# Patient Record
Sex: FEMALE | Race: WHITE | HISPANIC OR LATINO | Employment: STUDENT | URBAN - METROPOLITAN AREA
[De-identification: names, ages, dates, MRNs, and addresses within clinical notes are randomized per-mention and may not be internally consistent; named-entity substitution may affect disease eponyms.]

---
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
## 2023-10-25 ENCOUNTER — ULTRASOUND (OUTPATIENT)
Dept: OBGYN CLINIC | Facility: CLINIC | Age: 23
End: 2023-10-25

## 2023-10-25 VITALS — DIASTOLIC BLOOD PRESSURE: 70 MMHG | SYSTOLIC BLOOD PRESSURE: 127 MMHG | WEIGHT: 144.8 LBS | HEART RATE: 79 BPM

## 2023-10-25 DIAGNOSIS — Z11.3 SCREENING EXAMINATION FOR STD (SEXUALLY TRANSMITTED DISEASE): ICD-10-CM

## 2023-10-25 DIAGNOSIS — O36.80X0 ENCOUNTER TO DETERMINE FETAL VIABILITY OF PREGNANCY, SINGLE OR UNSPECIFIED FETUS: Primary | ICD-10-CM

## 2023-10-25 DIAGNOSIS — N89.8 VAGINAL DISCHARGE: ICD-10-CM

## 2023-10-25 PROCEDURE — 87491 CHLMYD TRACH DNA AMP PROBE: CPT

## 2023-10-25 PROCEDURE — 87591 N.GONORRHOEAE DNA AMP PROB: CPT

## 2023-10-25 NOTE — PROGRESS NOTES
[address] [facility]  10/25/2023    S: 26 y.o. [id number] who presents following positive pregnancy test at home 1 month ago. Her urine pregnancy test was negative in the office today, she feels comfortable with the results. She has had a mirena IUD since 2019 and took the pregnancy test because she began having cravings and felt more tired than usual. She had one day of spotting in September but has not had her period since before her IUD. She is experiencing occasional lower abdominal pain which goes away by itself but "feels similar to contractions". She also states she was having discharge with "black spots" and an abnormal smell. She is not concerned about having an STD.    [id number]  O:  Vitals:    10/25/23 1617   BP: 127/70   Pulse: 79   Weight: 65.7 kg (144 lb 12.8 oz)     General: Well appearing, no distress  Respiratory: Unlabored breathing  Cardiovascular: Regular rate. Abdomen: Soft, nontender  Extremities: Warm and well perfused. Non tender. : External genitalia with normal appearance and no lesions. On speculum exam, vagina and cervix with normal appearance and no lesions, small amount of physiologic white/clear discharge noted in vaginal vault, no blood. IUD strings visualized at cervical os. On bimanual exam, no CVA tenderness, uterus normal in size, no adnexal masses appreciated. 1. Encounter to determine fetal viability of pregnancy, single or unspecified fetus  Assessment & Plan:  Urine pregnancy test negative in the office  TVUS ordered to assess correct position of IUD  Beta Hcg ordered to confirm no pregnancy    Orders:  -     hCG, quantitative; Future  -     US abdomen and pelvis with transvaginal; Future; Expected date: 10/25/2023  -     Chlamydia/GC amplified DNA by PCR    2. Vaginal discharge    3.  Screening examination for STD (sexually transmitted disease)  Assessment & Plan:  Patient reporting abnormal discharge, GCCT taken today        Simone Asencio MD  PGY-I, OB/GYN  10/25/2023, 6:40 PM

## 2023-10-25 NOTE — ASSESSMENT & PLAN NOTE
Urine pregnancy test negative in the office  TVUS ordered to assess correct position of IUD  Beta Hcg ordered to confirm no pregnancy

## 2023-10-26 LAB
C TRACH DNA SPEC QL NAA+PROBE: NEGATIVE
N GONORRHOEA DNA SPEC QL NAA+PROBE: NEGATIVE

## 2024-02-15 ENCOUNTER — TELEPHONE (OUTPATIENT)
Dept: OBGYN CLINIC | Facility: CLINIC | Age: 24
End: 2024-02-15

## 2024-02-21 PROBLEM — Z11.3 SCREENING EXAMINATION FOR STD (SEXUALLY TRANSMITTED DISEASE): Status: RESOLVED | Noted: 2020-01-15 | Resolved: 2024-02-21

## 2024-06-19 ENCOUNTER — OFFICE VISIT (OUTPATIENT)
Dept: OBGYN CLINIC | Facility: CLINIC | Age: 24
End: 2024-06-19

## 2024-06-19 VITALS — WEIGHT: 167.4 LBS | HEART RATE: 68 BPM | SYSTOLIC BLOOD PRESSURE: 108 MMHG | DIASTOLIC BLOOD PRESSURE: 71 MMHG

## 2024-06-19 DIAGNOSIS — N94.6 DYSMENORRHEA: ICD-10-CM

## 2024-06-19 DIAGNOSIS — Z59.41 FOOD INSECURITY: Primary | ICD-10-CM

## 2024-06-19 DIAGNOSIS — Z87.42 HX OF OVARIAN CYST: ICD-10-CM

## 2024-06-19 DIAGNOSIS — R10.2 PELVIC PAIN: ICD-10-CM

## 2024-06-19 PROCEDURE — 99214 OFFICE O/P EST MOD 30 MIN: CPT | Performed by: OBSTETRICS & GYNECOLOGY

## 2024-06-19 RX ORDER — IBUPROFEN 800 MG/1
800 TABLET ORAL EVERY 6 HOURS PRN
Qty: 60 TABLET | Refills: 0 | Status: SHIPPED | OUTPATIENT
Start: 2024-06-19

## 2024-06-19 SDOH — ECONOMIC STABILITY - FOOD INSECURITY: FOOD INSECURITY: Z59.41

## 2024-06-19 NOTE — PROGRESS NOTES
Subjective:     Elvira Coffman is a 24 y.o.  female who presents for follow up after ruptured ovarian cyst in January.  Her pain did resolve initially after a few weeks but has returned over the last few weeks and she has it most days. She gets regular periods every month and her last period was 24. Her menstrual cycles usually last 4-5 days. She reports severe dysmenorrhea over the last 1-2 months. She is not taking anything for birth control as she is trying to get pregnant. She is not taking any pain medication for her dysmenorrhea.    Objective:    Vitals: not currently breastfeeding.There is no height or weight on file to calculate BMI.    Physical Exam  Constitutional:       Appearance: She is well-developed.   Cardiovascular:      Rate and Rhythm: Normal rate and regular rhythm.      Heart sounds: Normal heart sounds. No murmur heard.     No friction rub. No gallop.   Pulmonary:      Effort: Pulmonary effort is normal. No respiratory distress.      Breath sounds: No wheezing.   Abdominal:      Palpations: Abdomen is soft.      Tenderness: There is no abdominal tenderness.   Musculoskeletal:         General: No tenderness.   Neurological:      Mental Status: She is alert and oriented to person, place, and time.   Vitals reviewed.       Depression Screening Follow-up Plan: Patient's depression screening was positive with a PHQ-2 score of 0. Their PHQ-9 score was 0. Clinically patient does not have depression. No treatment is required.      Assessment/Plan:    Elvira was seen today for pelvic pain and follow up regarding ruptured ovarian cyst in January.     Diagnoses and all orders for this visit:    Food insecurity  -     Ambulatory referral to social work care management program; Future    Pelvic pain  -     US pelvis complete w transvaginal; Future    Hx of ovarian cyst  -     US pelvis complete w transvaginal; Future    Dysmenorrhea  -     ibuprofen (MOTRIN) 800 mg tablet; Take 1  tablet (800 mg total) by mouth every 6 (six) hours as needed for mild pain Take every 8 hours during menstrual cycle              Chirag Shields MD  6/19/2024  4:17 PM

## 2024-06-25 ENCOUNTER — PATIENT OUTREACH (OUTPATIENT)
Dept: OBGYN CLINIC | Facility: CLINIC | Age: 24
End: 2024-06-25

## 2024-06-25 NOTE — PROGRESS NOTES
NELIA OVERTON was referred by Dr. Shields on 06/19 for food insecurity, NELIA OVERTON called pt today with [id number] - [employer] interpretor, we called x2, we were able to leave a VM on the second call.

## 2024-06-27 ENCOUNTER — HOSPITAL ENCOUNTER (OUTPATIENT)
Dept: ULTRASOUND IMAGING | Facility: HOSPITAL | Age: 24
End: 2024-06-27

## 2024-06-27 DIAGNOSIS — Z87.42 HX OF OVARIAN CYST: ICD-10-CM

## 2024-06-27 DIAGNOSIS — R10.2 PELVIC PAIN: ICD-10-CM

## 2024-06-27 PROCEDURE — 76830 TRANSVAGINAL US NON-OB: CPT

## 2024-06-27 PROCEDURE — 76856 US EXAM PELVIC COMPLETE: CPT

## 2024-06-28 ENCOUNTER — PATIENT OUTREACH (OUTPATIENT)
Dept: OBGYN CLINIC | Facility: CLINIC | Age: 24
End: 2024-06-28

## 2024-06-28 NOTE — PROGRESS NOTES
NELIA OVERTON called pt with the [employer] interpretor to discuss any SDOH needs. Pt did answer on the second attempt. Pt reports that she has been having a hard time with food expenses lately. Pt reports that she is having more additional expenses now and that her SNAp was recently lowered in the amount that she receives. Pt has one child that lives with her. NELIA OVERTON provided resources for the free child lunch program for summers to her at [email or web address]    NELIA OVERTON will remain available as needed.

## 2024-07-02 ENCOUNTER — TELEPHONE (OUTPATIENT)
Dept: OBGYN CLINIC | Facility: CLINIC | Age: 24
End: 2024-07-02

## 2024-07-03 NOTE — TELEPHONE ENCOUNTER
Called PT to advise results are not back but phone number states cannot accept calls at this time.

## 2024-09-06 ENCOUNTER — TELEPHONE (OUTPATIENT)
Dept: OBGYN CLINIC | Facility: CLINIC | Age: 24
End: 2024-09-06

## 2024-09-06 NOTE — TELEPHONE ENCOUNTER
Patient came into the office asked if she can be seen today due to some bleeding while pregnant. Explained to patient provider are gone for the day and she should go to the emergency room. Patient verbalized understanding. Patient is scheduled for 9/23/24 based on last menstrual.

## 2024-09-23 ENCOUNTER — TELEPHONE (OUTPATIENT)
Dept: OBGYN CLINIC | Facility: CLINIC | Age: 24
End: 2024-09-23

## 2025-01-22 ENCOUNTER — OFFICE VISIT (OUTPATIENT)
Dept: FAMILY MEDICINE CLINIC | Facility: CLINIC | Age: 25
End: 2025-01-22

## 2025-01-22 VITALS
DIASTOLIC BLOOD PRESSURE: 78 MMHG | BODY MASS INDEX: 32.23 KG/M2 | TEMPERATURE: 97.5 F | HEIGHT: 63 IN | OXYGEN SATURATION: 99 % | RESPIRATION RATE: 18 BRPM | WEIGHT: 181.9 LBS | SYSTOLIC BLOOD PRESSURE: 114 MMHG | HEART RATE: 65 BPM

## 2025-01-22 DIAGNOSIS — Z00.00 ANNUAL PHYSICAL EXAM: Primary | ICD-10-CM

## 2025-01-22 DIAGNOSIS — Z11.59 NEED FOR HEPATITIS C SCREENING TEST: ICD-10-CM

## 2025-01-22 PROBLEM — N89.8 VAGINAL DISCHARGE: Status: RESOLVED | Noted: 2020-01-15 | Resolved: 2025-01-22

## 2025-01-22 PROCEDURE — 99395 PREV VISIT EST AGE 18-39: CPT | Performed by: FAMILY MEDICINE

## 2025-01-22 NOTE — ASSESSMENT & PLAN NOTE
Patient does not appear to have any medical disorder that would prevent him from driving safely  Advised to report back to [facility] immediately if any new conditions or limitations develop.   Discussed importance of seat belt safety  Advised not to use alcohol, drugs, or substances which inhibit ability to operate a vehicle.   Advised not use cell phone or other devices which can distract while operating a vehicle.   Form filled out, signed, and handed back to the patient.

## 2025-01-22 NOTE — PROGRESS NOTES
Adult Annual Physical  Name: Elvira Coffman      : 2000      MRN: [id number]  Encounter Provider: Romie Dao MD  Encounter Date: 2025   Encounter department: [facility]    Assessment & Plan  Annual physical exam  Patient does not appear to have any medical disorder that would prevent him from driving safely  Advised to report back to [facility] immediately if any new conditions or limitations develop.   Discussed importance of seat belt safety  Advised not to use alcohol, drugs, or substances which inhibit ability to operate a vehicle.   Advised not use cell phone or other devices which can distract while operating a vehicle.   Form filled out, signed, and handed back to the patient.          Need for hepatitis C screening test    Orders:    Hepatitis C Antibody; Future    Immunizations and preventive care screenings were discussed with patient today. Appropriate education was printed on patient's after visit summary.    Counseling:  Alcohol/drug use: discussed moderation in alcohol intake, the recommendations for healthy alcohol use, and avoidance of illicit drug use.  Dental Health: discussed importance of regular tooth brushing, flossing, and dental visits.  Sexual health: discussed sexually transmitted diseases, partner selection, use of condoms, avoidance of unintended pregnancy, and contraceptive alternatives.  Exercise: the importance of regular exercise/physical activity was discussed. Recommend exercise 3-5 times per week for at least 30 minutes.     BMI Counseling: Body mass index is 32.74 kg/m². The BMI is above normal. Nutrition recommendations include decreasing portion sizes, decreasing fast food intake and increasing intake of lean protein. Exercise recommendations include exercising 3-5 times per week. No pharmacotherapy was ordered. Rationale for BMI follow-up plan is due to patient being overweight or obese.      Depression Screening and Follow-up Plan: Patient was screened for depression during today's encounter. They screened negative with a PHQ-2 score of 0.        History of Present Illness     Adult Annual Physical:  Patient presents for annual physical. Elvira is a 24 y.o female presenting today for annual physical. Patient reports to feel well and has no complaints today. .     Diet and Physical Activity:  - Diet/Nutrition: poor diet.  - Exercise: no formal exercise.    Depression Screening:  - PHQ-2 Score: 0    General Health:  - Sleep: 7-8 hours of sleep on average.  - Hearing: normal hearing bilateral ears.  - Vision: no vision problems and most recent eye exam > 1 year ago.  - Dental: regular dental visits and brushes teeth twice daily.    /GYN Health:  - Follows with GYN: yes.   - Menopause: premenopausal.   - Last menstrual cycle: 1/13/2025.   - History of STDs: no  - Contraception:. Planning on getting pregnant. Reports taking OTC prenatal vitamins      Advanced Care Planning:  - Has an advanced directive?: no    - Has a durable medical POA?: no    - ACP document given to patient?: no      Review of Systems   Constitutional:  Negative for fever.   HENT:  Negative for ear pain and sore throat.    Eyes:  Negative for visual disturbance.   Respiratory:  Negative for cough and shortness of breath.    Cardiovascular:  Negative for chest pain and palpitations.   Gastrointestinal:  Negative for abdominal pain and vomiting.   Genitourinary:  Negative for dysuria and hematuria.   Musculoskeletal:  Negative for arthralgias and back pain.   Skin:  Negative for rash.   Neurological:  Negative for dizziness, seizures and syncope.   Psychiatric/Behavioral:  Negative for decreased concentration, hallucinations and sleep disturbance. The patient is not nervous/anxious.    All other systems reviewed and are negative.        Objective   /78 (BP Location: Left arm, Patient Position: Sitting, Cuff Size: Standard)    "Pulse 65   Temp 97.5 °F (36.4 °C) (Temporal)   Resp 18   Ht 5' 2.5\" (1.588 m)   Wt 82.5 kg (181 lb 14.4 oz)   LMP 01/10/2025 (Exact Date)   SpO2 99%   BMI 32.74 kg/m²     Physical Exam  Constitutional:       General: She is not in acute distress.     Appearance: She is not ill-appearing.   HENT:      Head: Normocephalic and atraumatic.      Right Ear: Tympanic membrane and external ear normal. There is no impacted cerumen.      Left Ear: Tympanic membrane and external ear normal. There is no impacted cerumen.      Nose: Nose normal.      Mouth/Throat:      Mouth: Mucous membranes are moist.      Pharynx: Oropharynx is clear. No oropharyngeal exudate or posterior oropharyngeal erythema.   Eyes:      General: No scleral icterus.     Conjunctiva/sclera: Conjunctivae normal.   Cardiovascular:      Rate and Rhythm: Normal rate and regular rhythm.      Pulses: Normal pulses.      Heart sounds: No murmur heard.     No gallop.   Pulmonary:      Effort: Pulmonary effort is normal. No respiratory distress.      Breath sounds: No wheezing or rhonchi.   Abdominal:      General: Bowel sounds are normal. There is no distension.      Tenderness: There is no abdominal tenderness. There is no guarding.   Musculoskeletal:      Cervical back: Normal range of motion.      Right lower leg: No edema.      Left lower leg: No edema.   Skin:     General: Skin is warm and dry.      Capillary Refill: Capillary refill takes less than 2 seconds.   Neurological:      General: No focal deficit present.      Mental Status: She is alert and oriented to person, place, and time.      Motor: No weakness.      Gait: Gait normal.   Psychiatric:         Mood and Affect: Mood normal.         Behavior: Behavior normal.         "

## 2025-05-13 ENCOUNTER — TELEPHONE (OUTPATIENT)
Dept: OBGYN CLINIC | Facility: CLINIC | Age: 25
End: 2025-05-13

## 2025-06-03 NOTE — PROGRESS NOTES
Assessment & Plan  18 y o  Bruce Kamara at 38w6d presenting for routine prenatal visit  Discussed labor precautions, provided hospital number to contact OB Dr  on call  Problem List Items Addressed This Visit        Other    Previous  section complicating pregnancy    Prenatal care in third trimester - Primary    38 weeks gestation of pregnancy        ____________________________________________________________  Subjective  She is without complaint  She denies contractions, loss of fluid, or vaginal bleeding  She feels regular fetal movements      Objective  BP (!) 125/78   Pulse 96   Ht 5' 3" (1 6 m)   Wt 80 7 kg (178 lb)   LMP 2018 (Approximate)   Breastfeeding? No   BMI 31 53 kg/m²     Fetal Heart Rate: 135    Patient's Active Problem List  Patient Active Problem List   Diagnosis    Previous  section complicating pregnancy    Hx of preeclampsia, prior pregnancy, currently pregnant    Threatened  labor    Prenatal care in third trimester    38 weeks gestation of pregnancy     Plan  Call with vaginal bleeding, loss of fluid, regular contractions, decreased fetal movement, other needs or concerns  Return in 1 week  Pt verbalized understanding of all discussed 
No

## 2025-06-04 ENCOUNTER — INITIAL PRENATAL (OUTPATIENT)
Dept: OBGYN CLINIC | Facility: CLINIC | Age: 25
End: 2025-06-04

## 2025-06-04 VITALS
BODY MASS INDEX: 34.02 KG/M2 | HEART RATE: 76 BPM | OXYGEN SATURATION: 99 % | DIASTOLIC BLOOD PRESSURE: 66 MMHG | WEIGHT: 192 LBS | SYSTOLIC BLOOD PRESSURE: 110 MMHG | HEIGHT: 63 IN

## 2025-06-04 DIAGNOSIS — Z3A.11 11 WEEKS GESTATION OF PREGNANCY: Primary | ICD-10-CM

## 2025-06-04 NOTE — LETTER
"    [facility] REFERRAL      Date: 6/4/2025    Patient name: Elvira Coffman    YOB: 2000    Estimated Date of Delivery: 12/19/25      /66 (BP Location: Left arm, Patient Position: Sitting, Cuff Size: Standard)   Pulse 76   Ht 5' 2.5\" (1.588 m)   Wt 87.1 kg (192 lb)   LMP 04/13/2025   SpO2 99%   BMI 34.56 kg/m²         Thank you,  EWA Mejia            [facility] locations:   1. [facility]       [address] [place] [address]       Phone: [phone]       Fax: [phone]     2. [facility]       [address] [address]       Phone: [phone]       Fax: [phone]       "

## 2025-06-04 NOTE — LETTER
6/4/2025      This letter is to confirm that Elvira Coffman is pregnant and is under our care.  Her Estimated Date of Delivery: 12/19/25.    If you have any questions or concerns, please contact our office.  Thank you,    EWA Mejia

## 2025-06-04 NOTE — LETTER
Dentist Letter    Elvira WattstiguaCruz  2000  [address]  [address]  [facility] [place] [facility] [address]          06/04/25    We have had several requests from local dentist requesting permission to perform procedures on our patients who are pregnant. We wish to respond with this letter regarding some of the more routine procedures that we have been asked about.    The following procedures may be performed on our obstetric patients:   1. Administration of local anesthesia   2. Administration of antibiotics such as PCN, Ampicillin, and Erythromycin.   3. Administration of pain medications such as Tylenol, Tylenol with codeine, and if needed Percocet.   4. Shielded X-rays    Should you have any questions, please do not hesitate to contact at [phone].        Sincerely,    [facility]   [phone]

## 2025-06-04 NOTE — PROGRESS NOTES
"OBSTETRIC INTAKE VISIT    Elvira Coffman presents today for initial OB visit and intake at 11w5d. LMP - 25. History obtained from patient and she reports it as follows:    Past Medical History[1]  Past Surgical History[2]  OB History    Para Term  AB Living   3 2 2 0 0 2   SAB IAB Ectopic Multiple Live Births   0 0 0 0 2      # Outcome Date GA Lbr Carter/2nd Weight Sex Type Anes PTL Lv   3 Current            2 Term 19 40w1d  3572 g (7 lb 14 oz) F    KACIE   1 Term 10/13/16 39w0d   M CS-Unspec  N KACIE      Birth Comments: reports  was done secondary to gestation hypertension, denies preeclampsia     Social History[3]    Current Outpatient Medications   Medication Instructions    Prenatal MV & Min w/FA-DHA (PRENATAL GUMMIES PO) Daily       Allergies[4]    Vitals: /66 (BP Location: Left arm, Patient Position: Sitting, Cuff Size: Standard)   Pulse 76   Ht 5' 2.5\" (1.588 m)   Wt 87.1 kg (192 lb)   LMP 2025   SpO2 99%   BMI 34.56 kg/m²     Review of Systems:  Denies vaginal bleeding or leaking fluid.  Denies abdominal/pelvic pain or contractions.    Plan:  1. OB labwork ordered today to include Prenatal Panel, HCV antibody, Hgb fractionation cascade.  Other labs include Glucose 1H PG, protein/creatine ratio, comprehensive metabolic panel.  Advised patient to have drawn within the next few days.  2. Front staff will help pt schedule ultrasound with MFM.  3. Return 25 for H&P prenatal visit.  4. Referrals placed for WIC, , and [facility].  5. Given vaccines: none due.  6. Patient's depression screening was assessed with a PHQ-2 score of 2. Clinically patient does not have depression. No treatment is required.   will call patient.  7. Reviewed the following educational topics with patient:   -routine prenatal visit/ultrasound/labwork schedule   -hospital for delivery and office phone/answering service contact " "information   -nutritional demands of pregnancy, healthy dietary habits   -listeria, toxoplasmosis, seafood precautions   -weight gain expectations (based on pre-pregnant BMI)   -exercise, rest, and sexual activity during pregnancy   -abstinence from alcohol, tobacco, and illegal drugs   -common discomforts of pregnancy and appropriate management   -OTC medications safe to use in pregnancy   -genetic screening options   -vaccines in pregnancy   -symptoms to report to OB provider    -signs of PTL and pre-eclampsia    -vaginal bleeding/leaking of fluid    -severe n/v-unable to tolerate ANY food/fluids for more than 24 hours           [1]   Past Medical History:  Diagnosis Date    Anemia     anemia in pregnancy    Chronic kidney disease     \"kidney pain\" during 1st pregnancy    Hypertension     Hx pre-eclampsia.    Migraine     with pregnancy    Pre-eclampsia    [2]   Past Surgical History:  Procedure Laterality Date     SECTION      pt has 2 year old son, mom had a  due to high blood pressure     AK  DELIVERY ONLY N/A 2019    Procedure:  SECTION () REPEAT;  Surgeon: Sherrill Hanna MD;  Location: [facility];  Service: Obstetrics   [3]   Social History  Tobacco Use    Smoking status: Some Days     Types: E-Cigarettes    Smokeless tobacco: Never   Vaping Use    Vaping status: Former    Substances: Flavoring   Substance Use Topics    Alcohol use: Yes     Alcohol/week: 1.0 standard drink of alcohol     Types: 1 Cans of beer per week     Comment: socially    Drug use: Never   [4] No Known Allergies    "

## 2025-06-04 NOTE — PATIENT INSTRUCTIONS
SENALES FAUSTINO EL EMBARAZO  Llame a nuestra oficina al [phone] para cualquiera de los siguientes:    1. sangrado vaginal  2. Dolor abdominal milad que no desaparece.  3. Fiebre (más de 100.4 y no se zuri con Tylenol)  4. Vómitos persistentes que nichols más de 24 horas.  5. dolor de pecho  6. Dolor o ardor al orinar.  7. Dolor de maddie severo que no se resuelve con Tylenol  8. Visión borrosa o mike puntos en persaud visión.  9. Hinchazón repentina de persaud rosalva o maite.  10. Enrojecimiento, hinchazón o dolor en fortino pierna.  11. Un aumento de peso repentino en pocos días.  12. Contar los movimientos fetales del bebé. (después de 28 semanas o el sexto mes de embarazo)  13. Fortino pérdida de líquido acuoso de la vagina: puede ser un chorro, un goteo o fortino humedad continua  14. Después de 20 semanas de embarazo, calambres rítmicos (más de 4 por hora) o menstruales jovanni dolor bajo / pélvico

## 2025-06-06 ENCOUNTER — PATIENT OUTREACH (OUTPATIENT)
Dept: OBGYN CLINIC | Facility: CLINIC | Age: 25
End: 2025-06-06

## 2025-06-11 ENCOUNTER — APPOINTMENT (OUTPATIENT)
Dept: LAB | Facility: HOSPITAL | Age: 25
End: 2025-06-11
Payer: COMMERCIAL

## 2025-06-11 ENCOUNTER — INITIAL PRENATAL (OUTPATIENT)
Dept: OBGYN CLINIC | Facility: CLINIC | Age: 25
End: 2025-06-11

## 2025-06-11 VITALS — BODY MASS INDEX: 34.95 KG/M2 | WEIGHT: 194.2 LBS | SYSTOLIC BLOOD PRESSURE: 120 MMHG | DIASTOLIC BLOOD PRESSURE: 74 MMHG

## 2025-06-11 DIAGNOSIS — Z3A.11 11 WEEKS GESTATION OF PREGNANCY: ICD-10-CM

## 2025-06-11 DIAGNOSIS — O99.211 OBESITY AFFECTING PREGNANCY IN FIRST TRIMESTER, UNSPECIFIED OBESITY TYPE: ICD-10-CM

## 2025-06-11 DIAGNOSIS — Z98.891 HISTORY OF 2 CESAREAN SECTIONS: ICD-10-CM

## 2025-06-11 DIAGNOSIS — O09.299 HISTORY OF PRE-ECLAMPSIA IN PRIOR PREGNANCY, CURRENTLY PREGNANT: ICD-10-CM

## 2025-06-11 DIAGNOSIS — Z3A.12 12 WEEKS GESTATION OF PREGNANCY: Primary | ICD-10-CM

## 2025-06-11 DIAGNOSIS — Z11.3 SCREEN FOR STD (SEXUALLY TRANSMITTED DISEASE): ICD-10-CM

## 2025-06-11 DIAGNOSIS — Z87.59 HISTORY OF GESTATIONAL HYPERTENSION: ICD-10-CM

## 2025-06-11 DIAGNOSIS — Z34.81 PRENATAL CARE, SUBSEQUENT PREGNANCY IN FIRST TRIMESTER: ICD-10-CM

## 2025-06-11 DIAGNOSIS — Z11.59 NEED FOR HEPATITIS C SCREENING TEST: ICD-10-CM

## 2025-06-11 PROBLEM — Z00.00 ANNUAL PHYSICAL EXAM: Status: RESOLVED | Noted: 2020-01-15 | Resolved: 2025-06-11

## 2025-06-11 LAB
ABO GROUP BLD: NORMAL
ALBUMIN SERPL BCG-MCNC: 4 G/DL (ref 3.5–5)
ALP SERPL-CCNC: 67 U/L (ref 34–104)
ALT SERPL W P-5'-P-CCNC: 22 U/L (ref 7–52)
ANION GAP SERPL CALCULATED.3IONS-SCNC: 10 MMOL/L (ref 4–13)
AST SERPL W P-5'-P-CCNC: 20 U/L (ref 13–39)
BASOPHILS # BLD AUTO: 0.03 THOUSANDS/ÂΜL (ref 0–0.1)
BASOPHILS NFR BLD AUTO: 0 % (ref 0–1)
BILIRUB SERPL-MCNC: 0.17 MG/DL (ref 0.2–1)
BILIRUB UR QL STRIP: NEGATIVE
BLD GP AB SCN SERPL QL: NEGATIVE
BUN SERPL-MCNC: 7 MG/DL (ref 5–25)
CALCIUM SERPL-MCNC: 9.1 MG/DL (ref 8.4–10.2)
CHLORIDE SERPL-SCNC: 104 MMOL/L (ref 96–108)
CLARITY UR: CLEAR
CO2 SERPL-SCNC: 23 MMOL/L (ref 21–32)
COLOR UR: YELLOW
CREAT SERPL-MCNC: 0.41 MG/DL (ref 0.6–1.3)
CREAT UR-MCNC: 99.3 MG/DL
EOSINOPHIL # BLD AUTO: 0.06 THOUSAND/ÂΜL (ref 0–0.61)
EOSINOPHIL NFR BLD AUTO: 1 % (ref 0–6)
ERYTHROCYTE [DISTWIDTH] IN BLOOD BY AUTOMATED COUNT: 13.2 % (ref 11.6–15.1)
GFR SERPL CREATININE-BSD FRML MDRD: 146 ML/MIN/1.73SQ M
GLUCOSE SERPL-MCNC: 79 MG/DL (ref 65–140)
GLUCOSE UR STRIP-MCNC: NEGATIVE MG/DL
HCT VFR BLD AUTO: 34.9 % (ref 34.8–46.1)
HGB BLD-MCNC: 11.5 G/DL (ref 11.5–15.4)
HGB UR QL STRIP.AUTO: NEGATIVE
IMM GRANULOCYTES # BLD AUTO: 0.05 THOUSAND/UL (ref 0–0.2)
IMM GRANULOCYTES NFR BLD AUTO: 1 % (ref 0–2)
KETONES UR STRIP-MCNC: NEGATIVE MG/DL
LEUKOCYTE ESTERASE UR QL STRIP: NEGATIVE
LYMPHOCYTES # BLD AUTO: 2.46 THOUSANDS/ÂΜL (ref 0.6–4.47)
LYMPHOCYTES NFR BLD AUTO: 22 % (ref 14–44)
MCH RBC QN AUTO: 29.7 PG (ref 26.8–34.3)
MCHC RBC AUTO-ENTMCNC: 33 G/DL (ref 31.4–37.4)
MCV RBC AUTO: 90 FL (ref 82–98)
MONOCYTES # BLD AUTO: 0.69 THOUSAND/ÂΜL (ref 0.17–1.22)
MONOCYTES NFR BLD AUTO: 6 % (ref 4–12)
NEUTROPHILS # BLD AUTO: 7.73 THOUSANDS/ÂΜL (ref 1.85–7.62)
NEUTS SEG NFR BLD AUTO: 70 % (ref 43–75)
NITRITE UR QL STRIP: NEGATIVE
NRBC BLD AUTO-RTO: 0 /100 WBCS
PH UR STRIP.AUTO: 6 [PH]
PLATELET # BLD AUTO: 211 THOUSANDS/UL (ref 149–390)
PMV BLD AUTO: 12.3 FL (ref 8.9–12.7)
POTASSIUM SERPL-SCNC: 3.4 MMOL/L (ref 3.5–5.3)
PROT SERPL-MCNC: 7 G/DL (ref 6.4–8.4)
PROT UR STRIP-MCNC: NEGATIVE MG/DL
PROT UR-MCNC: 8.4 MG/DL
PROT/CREAT UR: 0.1 MG/G{CREAT}
RBC # BLD AUTO: 3.87 MILLION/UL (ref 3.81–5.12)
RH BLD: POSITIVE
RUBV IGG SERPL IA-ACNC: 60.7 IU/ML
SODIUM SERPL-SCNC: 137 MMOL/L (ref 135–147)
SP GR UR STRIP.AUTO: 1.02 (ref 1–1.04)
SPECIMEN EXPIRATION DATE: NORMAL
UROBILINOGEN UA: NEGATIVE MG/DL
WBC # BLD AUTO: 11.02 THOUSAND/UL (ref 4.31–10.16)

## 2025-06-11 PROCEDURE — 86850 RBC ANTIBODY SCREEN: CPT

## 2025-06-11 PROCEDURE — 86762 RUBELLA ANTIBODY: CPT

## 2025-06-11 PROCEDURE — 99214 OFFICE O/P EST MOD 30 MIN: CPT | Performed by: OBSTETRICS & GYNECOLOGY

## 2025-06-11 PROCEDURE — 86706 HEP B SURFACE ANTIBODY: CPT

## 2025-06-11 PROCEDURE — 86900 BLOOD TYPING SEROLOGIC ABO: CPT

## 2025-06-11 PROCEDURE — 87340 HEPATITIS B SURFACE AG IA: CPT

## 2025-06-11 PROCEDURE — 85025 COMPLETE CBC W/AUTO DIFF WBC: CPT

## 2025-06-11 PROCEDURE — 86780 TREPONEMA PALLIDUM: CPT

## 2025-06-11 PROCEDURE — 82570 ASSAY OF URINE CREATININE: CPT

## 2025-06-11 PROCEDURE — 80053 COMPREHEN METABOLIC PANEL: CPT

## 2025-06-11 PROCEDURE — 84156 ASSAY OF PROTEIN URINE: CPT

## 2025-06-11 PROCEDURE — 36415 COLL VENOUS BLD VENIPUNCTURE: CPT

## 2025-06-11 PROCEDURE — 87086 URINE CULTURE/COLONY COUNT: CPT

## 2025-06-11 PROCEDURE — 86803 HEPATITIS C AB TEST: CPT

## 2025-06-11 PROCEDURE — 83020 HEMOGLOBIN ELECTROPHORESIS: CPT

## 2025-06-11 PROCEDURE — 87491 CHLMYD TRACH DNA AMP PROBE: CPT | Performed by: OBSTETRICS & GYNECOLOGY

## 2025-06-11 PROCEDURE — 87591 N.GONORRHOEAE DNA AMP PROB: CPT | Performed by: OBSTETRICS & GYNECOLOGY

## 2025-06-11 PROCEDURE — 86901 BLOOD TYPING SEROLOGIC RH(D): CPT

## 2025-06-11 PROCEDURE — 87389 HIV-1 AG W/HIV-1&-2 AB AG IA: CPT

## 2025-06-11 RX ORDER — ASPIRIN 81 MG/1
162 TABLET, CHEWABLE ORAL DAILY
Qty: 60 TABLET | Refills: 11 | Status: SHIPPED | OUTPATIENT
Start: 2025-06-11

## 2025-06-11 NOTE — PROGRESS NOTES
Elvira Coffman presents today for routine OB visit at 12w5d.  HO=476/74  Wt=88.1 kg (194 lb 3.2 oz); Body mass index is 34.95 kg/m².; TWG=5.534 kg (12 lb 3.2 oz)  Fetal Heart Rate: 159  Abdomen: soft, non-tender.  She reports .  Denies vaginal bleeding or leaking of fluid.  Pap/GC/CT done.  Scheduled for ultrasound 25.  Reviewed common discomforts of pregnancy in first trimester and warning signs.  Advised to continue medications and return in 4 weeks.      Current Outpatient Medications   Medication Instructions    Prenatal MV & Min w/FA-DHA (PRENATAL GUMMIES PO) Daily         G3 Problems (from 25 to present)       Problem Noted Diagnosed Resolved    12 weeks gestation of pregnancy 2025 by Yardlie Toussaint-Foster, DO  No    History of 2  sections 2025 by Yardlie Toussaint-Foster, DO  No    Overview Signed 2025  4:26 PM by Yardlie Toussaint-Foster, DO   Desires TOLAC          BMI 34.0-34.9,adult 2025 by Yardlie Toussaint-Foster, DO  No    Overview Signed 2025  4:27 PM by Yardlie Toussaint-Foster, DO   Early glucola         Obesity affecting pregnancy in first trimester 2025 by Yardlie Toussaint-Foster, DO  No    Overview Signed 2025  4:27 PM by Yardlie Toussaint-Foster, DO   Early glucola         History of gestational hypertension 2025 by Yardlie Toussaint-Foster, DO  No    Overview Signed 2025  4:30 PM by Yardlie Toussaint-Foster, DO   Baseline labs pending

## 2025-06-12 ENCOUNTER — APPOINTMENT (OUTPATIENT)
Dept: LAB | Facility: HOSPITAL | Age: 25
End: 2025-06-12
Payer: COMMERCIAL

## 2025-06-12 DIAGNOSIS — Z3A.11 11 WEEKS GESTATION OF PREGNANCY: ICD-10-CM

## 2025-06-12 LAB
BACTERIA UR CULT: NORMAL
C TRACH DNA SPEC QL NAA+PROBE: NEGATIVE
GLUCOSE 1H P 50 G GLC PO SERPL-MCNC: 94 MG/DL (ref 70–134)
HBV SURFACE AB SER-ACNC: 70.3 MIU/ML
HBV SURFACE AG SER QL: NORMAL
HCV AB SER QL: NORMAL
HIV 1+2 AB+HIV1 P24 AG SERPL QL IA: NORMAL
N GONORRHOEA DNA SPEC QL NAA+PROBE: NEGATIVE
TREPONEMA PALLIDUM IGG+IGM AB [PRESENCE] IN SERUM OR PLASMA BY IMMUNOASSAY: NORMAL

## 2025-06-12 PROCEDURE — 36415 COLL VENOUS BLD VENIPUNCTURE: CPT

## 2025-06-12 PROCEDURE — 82950 GLUCOSE TEST: CPT

## 2025-06-13 LAB
HGB A MFR BLD: 2.4 % (ref 1.8–3.2)
HGB A MFR BLD: 97.6 % (ref 96.4–98.8)
HGB F MFR BLD: 0 % (ref 0–2)
HGB FRACT BLD-IMP: NORMAL
HGB S MFR BLD: 0 %

## 2025-06-16 PROBLEM — Z3A.13 13 WEEKS GESTATION OF PREGNANCY: Status: ACTIVE | Noted: 2025-06-11

## 2025-06-16 PROBLEM — O34.219 HISTORY OF CESAREAN SECTION COMPLICATING PREGNANCY: Status: ACTIVE | Noted: 2025-06-11

## 2025-06-18 NOTE — PATIENT INSTRUCTIONS
Usted eligió someterse a un examen prenatal no invasivo (NIPS). Se trata de un análisis de alexus para detectar los cuatro síndromes genéticos más comunes (trisomía 21, trisomía 13, trisomía 18 y anomalías de los cromosomas sexuales). También PUEDE informar el sexo biológico del feto si optó por conocer esta información. Puede llamar a nuestra oficina para revisar verbalmente los resultados y evitar conocer esta información sin darse cuenta a través de MyChart, si lo desea. Los resultados serán visibles en persaud portal mychart entre 5 y 7 días hábiles a partir del momento en que se realice la prueba. Siga todas las instrucciones sobre el costo/cobertura del seguro que se le proporcionaron hoy. Comuníquese con nuestra oficina si tiene alguna inquietud o pregunta. Necesitará fortino prueba de detección de doroteo bífida (llamada MSAFP) para el bebé a partir de las 15 semanas de gestación, que será solicitada por el consultorio de persaud obstetra. Esta prueba permite fortino detección más temprana de la doroteo bífida de lo que es posible mediante ecografía y se recomienda en todos los embarazos.       Lesli por elegirnos para persaud atención  hoy.  Si tiene alguna pregunta sobre persaud ultrasonido o persaud atención, no dude en comunicarse con nosotros o con persaud obstetra principal.      Algunas instrucciones generales para tu embarazo son:   Ejercicio: intente realizar 150 minutos por semana de ejercicio regular.  ¡Caminar es genial!   Nutrición: Elija dee saludables de calcio, woo y proteínas.  Camilla los alimentos ultraprocesados y el azúcar añadido.   Obtenga más información sobre la preeclampsia: la preeclampsia es fortino complicación común y potencialmente grave de la presión arterial geovany jeramie el embarazo.  Persaud médico debe evaluar fortino presión arterial de 140 mmHg (sistólica o número superior) o 90 mmHg (diastólica o número inferior).  A veces se receta aspirina al comienzo del embarazo para prevenir la preeclampsia en mujeres  con factores de riesgo; pregúntele a presaud obstetra si debe jorge tessa medicamento.  Para obtener más recursos, visite:  [email or web address].[email or web address]   Si fuma, intente dejar de fumar por completo, philip también trate de reducir persaud consumo de tabaco tanto jovanni sea posible (lo antes posible).  No vapear.  Evite también los productos de cannabis.   Otras señales de advertencia a las que debe prestar atención jeramie el embarazo o el posparto: dolor en el pecho, respiración obstruida o dificultad para respirar, convulsiones, pensamientos de lastimarse a usted o a persaud bebé, sangrado, dolor o hinchazón en fortino pierna, fiebre o dolor de maddie (consulte [employer]- [place] de Advertencia del NACIMIENTO).  Si esto sucede, llame al 911. La picazón tampoco es normal jeramie el embarazo y si la experimenta, especialmente en las maite y los pies, y puede empeorar por la noche, notifique a froy médicos.

## 2025-06-19 ENCOUNTER — ANCILLARY PROCEDURE (OUTPATIENT)
Dept: PERINATAL CARE | Facility: CLINIC | Age: 25
End: 2025-06-19
Attending: OBSTETRICS & GYNECOLOGY
Payer: COMMERCIAL

## 2025-06-19 VITALS
BODY MASS INDEX: 34.4 KG/M2 | SYSTOLIC BLOOD PRESSURE: 120 MMHG | OXYGEN SATURATION: 98 % | HEART RATE: 88 BPM | DIASTOLIC BLOOD PRESSURE: 64 MMHG | HEIGHT: 63 IN

## 2025-06-19 DIAGNOSIS — Z36.82 ENCOUNTER FOR (NT) NUCHAL TRANSLUCENCY SCAN: ICD-10-CM

## 2025-06-19 DIAGNOSIS — O09.299 HISTORY OF PRE-ECLAMPSIA IN PRIOR PREGNANCY, CURRENTLY PREGNANT: ICD-10-CM

## 2025-06-19 DIAGNOSIS — Z36.0 ENCOUNTER FOR ANTENATAL SCREENING FOR CHROMOSOMAL ANOMALIES: Primary | ICD-10-CM

## 2025-06-19 DIAGNOSIS — Z3A.13 13 WEEKS GESTATION OF PREGNANCY: ICD-10-CM

## 2025-06-19 DIAGNOSIS — E66.89 OTHER OBESITY AFFECTING PREGNANCY IN FIRST TRIMESTER: ICD-10-CM

## 2025-06-19 DIAGNOSIS — O34.219 HISTORY OF CESAREAN SECTION COMPLICATING PREGNANCY: ICD-10-CM

## 2025-06-19 DIAGNOSIS — Z87.59 HISTORY OF GESTATIONAL HYPERTENSION: ICD-10-CM

## 2025-06-19 DIAGNOSIS — O99.211 OTHER OBESITY AFFECTING PREGNANCY IN FIRST TRIMESTER: ICD-10-CM

## 2025-06-19 PROCEDURE — 36415 COLL VENOUS BLD VENIPUNCTURE: CPT | Performed by: STUDENT IN AN ORGANIZED HEALTH CARE EDUCATION/TRAINING PROGRAM

## 2025-06-19 PROCEDURE — 76817 TRANSVAGINAL US OBSTETRIC: CPT | Performed by: STUDENT IN AN ORGANIZED HEALTH CARE EDUCATION/TRAINING PROGRAM

## 2025-06-19 PROCEDURE — 76801 OB US < 14 WKS SINGLE FETUS: CPT | Performed by: STUDENT IN AN ORGANIZED HEALTH CARE EDUCATION/TRAINING PROGRAM

## 2025-06-19 PROCEDURE — 76813 OB US NUCHAL MEAS 1 GEST: CPT | Performed by: STUDENT IN AN ORGANIZED HEALTH CARE EDUCATION/TRAINING PROGRAM

## 2025-06-19 PROCEDURE — 99244 OFF/OP CNSLTJ NEW/EST MOD 40: CPT | Performed by: STUDENT IN AN ORGANIZED HEALTH CARE EDUCATION/TRAINING PROGRAM

## 2025-06-19 PROCEDURE — NC001 PR NO CHARGE: Performed by: STUDENT IN AN ORGANIZED HEALTH CARE EDUCATION/TRAINING PROGRAM

## 2025-06-19 NOTE — ASSESSMENT & PLAN NOTE
She has a history of gestational hypertension, which was diagnosed in her pregnancy in 2016. This occurred in the [place].  Per chart review, she still delivered at 39 weeks.  Low-dose aspirin is recommended as detailed below.  Blood pressure was stable in the office today (120/64).

## 2025-06-19 NOTE — ASSESSMENT & PLAN NOTE
We discussed her history of two prior  sections, one of which was performed in the [place], indicated for preeclampsia.  Assessment of placental location is indicated at the time of anatomy scan to assess for risk of placenta accreta spectrum (PAS), as PAS is a risk of prior .  She reports that although she desires a vaginal delivery, she has already been counseled by her OB that a repeat  may be recommended.

## 2025-06-19 NOTE — ASSESSMENT & PLAN NOTE
We reviewed the availability of aneuploidy screening, as well as diagnostic testing, which are available to all pregnant women. We reviewed limitations, risks, and benefits of screening and testing. She does not wish to pursue diagnostic testing at this time. She elected to proceed with Non Invasive Prenatal Screening (NIPS).   - MSAFP screening should be ordered through the OB office at 15-20 weeks gestation, and completed prior to fetal anatomic survey.    - A detailed anatomic survey as well as transvaginal cervical length screening are recommended between 21-22 weeks gestation.

## 2025-06-19 NOTE — LETTER
"2025     Yardlie Toussaint-Foster, DO  [address]  [address]  [place] [address]    Patient: Elvira Coffman   YOB: 2000   Date of Visit: 2025       Dear Dr. Yardlie Toussaint-Foster, DO:    Thank you for referring Elvira Coffman to me for evaluation. Below are my notes for this consultation.    If you have questions, please do not hesitate to call me. I look forward to following your patient along with you.         Sincerely,        Iveth Parrish MD        CC: No Recipients    Erin Escobedo PA-C  2025  9:26 AM  Attested Addendum  Consultation - Maternal Fetal Medicine   Elvira Coffman 25 y.o. MRN: [id number]  Encounter: [id number]    Assessment & Plan   Elvira is a 25 y.o. year-old  with an CASI of 2025, by Ultrasound at 13w6d, here for nuchal translucency ultrasound, with history notable for prior gestational hypertension and preeclampsia, as well as two prior  sections.  Her antepartum course has been uncomplicated. She has no complaints today. [employer]  #[id number] was utilized for this encounter.     Nuchal translucency measures within normal limits (<95th percentile for this crown-rump length). See ultrasound report under the \"imaging\" tab.       Problem List Items Addressed This Visit       13 weeks gestation of pregnancy    We reviewed the availability of aneuploidy screening, as well as diagnostic testing, which are available to all pregnant women. We reviewed limitations, risks, and benefits of screening and testing. She does not wish to pursue diagnostic testing at this time. She elected to proceed with Non Invasive Prenatal Screening (NIPS).   - MSAFP screening should be ordered through the OB office at 15-20 weeks gestation, and completed prior to fetal anatomic survey.    - A detailed anatomic survey as well as transvaginal cervical length screening are recommended between 21-22 weeks " gestation.          History of  section complicating pregnancy    We discussed her history of two prior  sections, one of which was performed in the [place], indicated for preeclampsia.  Assessment of placental location is indicated at the time of anatomy scan to assess for risk of placenta accreta spectrum (PAS), as PAS is a risk of prior .  She reports that although she desires a vaginal delivery, she has already been counseled by her OB that a repeat  may be recommended.          Obesity affecting pregnancy in first trimester    Body mass index > 30 kg/m2 is associated with an increased risk of several pregnancy complications, including hypertensive disorders, diabetes, abnormal fetal growth, fetal malformations. The risk of  delivery is also increased, as are wound complications in the event of  delivery. A healthy diet and exercise, as well as appropriate gestational weight gain (no more than 11-20 pounds) can help reduce risk of these complications. 150 minutes of moderate exercise per week is recommended for all pregnant women. Nutrition counseling is also available if desired.    - She completed an early 1 hour glucose test, which was normal (94). Routine re-screening is recommended at 24-28 weeks.          History of gestational hypertension    She has a history of gestational hypertension, which was diagnosed in her pregnancy in 2016. This occurred in the [place].  Per chart review, she still delivered at 39 weeks.  Low-dose aspirin is recommended as detailed below.  Blood pressure was stable in the office today (120/64).         History of pre-eclampsia in prior pregnancy, currently pregnant    We reviewed her history of preeclampsia in her prior pregnancy.  Though she does not recall specifics of this diagnosis, as it occurred in the [place], this history is documented in chart review.  It appears that her  was  indicated for development of gestational hypertension with superimposed preeclampsia.  Baseline preeclampsia labs were reviewed prior to her visit today and were within normal limits.     Aspirin use to reduce risk of preeclampsia:   The use of low dose aspirin in pregnancy (162mg) is recommended in women with a high risk, or multiple moderate risk factors for preeclampsia.  Aspirin therapy should be initiated between 12-28 weeks gestation, and is most effective if started prior to 16 weeks gestation, and stopped by 36 weeks gestation. Low dose aspirin in pregnancy has been shown to reduce the incidence of preeclampsia in women with risk factors, and has been shown to be safe and without significant maternal or fetal risk.  I recommend initiating aspirin therapy, which has already been prescribed by her OB. Elvira was not aware that the prescription was already sent, so we reviewed dosing recommendations and which pharmacy to  her prescription at.          Other Visit Diagnoses         Encounter for  screening for chromosomal anomalies    -  Primary    Relevant Orders    [id number] PLUS Core+SCA      Encounter for (NT) nuchal translucency scan                Summary of follow up recommendations:   - Detailed anatomic survey at 21-22 weeks       History of Present Illness    Reason for consultation: Aneuploidy screening    Chief Complaint:   Chief Complaint   Patient presents with   • Pregnancy Ultrasound      Karen [id number]        Referring physician:   Yardlie Toussaint-foster, Do  [address]  [address]  [place] [address]    Dear Dr Toussaint-Foster,    Thank you very much for your kind referral of patient Elvira Coffman for Maternal-Fetal Medicine consultation. As you know, Elvira is a 25 y.o. year-old  with an CASI of 2025, by Ultrasound at 13w6d.  Total weight gain thus far in pregnancy has been 5.534 kg (12 lb 3.2 oz).  Her history is as follows:       OB  "History    Para Term  AB Living   3 2 2 0 0 2   SAB IAB Ectopic Multiple Live Births   0 0 0 0 2      # Outcome Date GA Lbr Carter/2nd Weight Sex Type Anes PTL Lv   3 Current            2 Term 19 40w1d  3572 g (7 lb 14 oz) F    KACIE   1 Term 10/13/16 39w0d   M CS-Unspec  N KACIE      Birth Comments: reports  was done secondary to gestation hypertension, with pre-eclampsia       Past Medical History:  Class I Obesity     Past Surgical History:  Past Surgical History[1]    Social History:   She denies current use of alcohol, drugs of abuse, tobacco, and marijuana products.     Family History:  Family history was reviewed using an office screening tool, and is negative for congenital anomalies, genetic diseases, and thromboembolism in first degree relatives of this pregnancy.     Medications:  Current Medications[2]    Allergies:  No Known Allergies    Obstetric Review of Symptoms:  She denies any contractions, abdominal pain, leakage of fluid, or vaginal bleeding. She does not report fetal movement as it is too early in her pregnancy. She denies any other complaints.     Objective  Vitals: Blood pressure 120/64, pulse 88, height 5' 3\" (1.6 m), last menstrual period 2025, SpO2 98%, not currently breastfeeding.Body mass index is 34.4 kg/m².  On examination, she is well appearing, awake, alert, and oriented. Mood and affect are appropriate.    ---------------------------------------    At the conclusion of today's encounter, all questions were answered to her satisfaction.  Please note that while the recent CURES Act permits medical records be visible for patient review, clinical documentation is intended for utilization by healthcare professionals, and also, if the patient reviews her medical records, she may unintentionally review clinical information such as fetal sex. Thank you very much for this kind referral and please do not hesitate to contact me with any further questions or " concerns.    Sincerely,    Erin Escobedo PA-C   Physician Assistant, Maternal-Fetal Medicine  [facility]      MDM:   Medical decision making for this encounter was moderate         [1]   Past Surgical History:  Procedure Laterality Date   •  SECTION      pt has 2 year old son, mom had a  due to high blood pressure    • [place]  DELIVERY ONLY N/A 2019    Procedure:  SECTION () REPEAT;  Surgeon: hSerrill Hanna MD;  Location: [facility];  Service: Obstetrics   [2]   Current Outpatient Medications:   •  aspirin 81 mg chewable tablet, Chew 2 tablets (162 mg total) daily, Disp: 60 tablet, Rfl: 11  •  Prenatal MV & Min w/FA-DHA (PRENATAL GUMMIES PO), Take by mouth daily, Disp: , Rfl:     Attestation signed by Iveth Parrish MD at 2025 10:04 AM:  I was the collaborating physician for Elvira Oh Augustus. I acknowledge the AP's documentation and services provided. I was available in the office, if needed, for consultation.   Iveth Parrish MD 25

## 2025-06-19 NOTE — PROGRESS NOTES
"Consultation - Maternal Fetal Medicine   Elvira Coffman 25 y.o. MRN: [id number]  Encounter: [id number]    Assessment & Plan    Elvira is a 25 y.o. year-old  with an CASI of 2025, by Ultrasound at 13w6d, here for nuchal translucency ultrasound, with history notable for prior gestational hypertension and preeclampsia, as well as two prior  sections.  Her antepartum course has been uncomplicated. She has no complaints today. [employer]  #[id number] was utilized for this encounter.     Nuchal translucency measures within normal limits (<95th percentile for this crown-rump length). See ultrasound report under the \"imaging\" tab.       Problem List Items Addressed This Visit       13 weeks gestation of pregnancy    We reviewed the availability of aneuploidy screening, as well as diagnostic testing, which are available to all pregnant women. We reviewed limitations, risks, and benefits of screening and testing. She does not wish to pursue diagnostic testing at this time. She elected to proceed with Non Invasive Prenatal Screening (NIPS).   - MSAFP screening should be ordered through the OB office at 15-20 weeks gestation, and completed prior to fetal anatomic survey.    - A detailed anatomic survey as well as transvaginal cervical length screening are recommended between 21-22 weeks gestation.          History of  section complicating pregnancy    We discussed her history of two prior  sections, one of which was performed in the [place], indicated for preeclampsia.  Assessment of placental location is indicated at the time of anatomy scan to assess for risk of placenta accreta spectrum (PAS), as PAS is a risk of prior .  She reports that although she desires a vaginal delivery, she has already been counseled by her OB that a repeat  may be recommended.          Obesity affecting pregnancy in first trimester    Body mass index > 30 kg/m2 is " associated with an increased risk of several pregnancy complications, including hypertensive disorders, diabetes, abnormal fetal growth, fetal malformations. The risk of  delivery is also increased, as are wound complications in the event of  delivery. A healthy diet and exercise, as well as appropriate gestational weight gain (no more than 11-20 pounds) can help reduce risk of these complications. 150 minutes of moderate exercise per week is recommended for all pregnant women. Nutrition counseling is also available if desired.    - She completed an early 1 hour glucose test, which was normal (94). Routine re-screening is recommended at 24-28 weeks.          History of gestational hypertension    She has a history of gestational hypertension, which was diagnosed in her pregnancy in 2016. This occurred in the [place].  Per chart review, she still delivered at 39 weeks.  Low-dose aspirin is recommended as detailed below.  Blood pressure was stable in the office today (120/64).         History of pre-eclampsia in prior pregnancy, currently pregnant    We reviewed her history of preeclampsia in her prior pregnancy.  Though she does not recall specifics of this diagnosis, as it occurred in the [place], this history is documented in chart review.  It appears that her  was indicated for development of gestational hypertension with superimposed preeclampsia.  Baseline preeclampsia labs were reviewed prior to her visit today and were within normal limits.     Aspirin use to reduce risk of preeclampsia:   The use of low dose aspirin in pregnancy (162mg) is recommended in women with a high risk, or multiple moderate risk factors for preeclampsia.  Aspirin therapy should be initiated between 12-28 weeks gestation, and is most effective if started prior to 16 weeks gestation, and stopped by 36 weeks gestation. Low dose aspirin in pregnancy has been shown to reduce the incidence of  preeclampsia in women with risk factors, and has been shown to be safe and without significant maternal or fetal risk.  I recommend initiating aspirin therapy, which has already been prescribed by her OB. Elvira was not aware that the prescription was already sent, so we reviewed dosing recommendations and which pharmacy to  her prescription at.          Other Visit Diagnoses         Encounter for  screening for chromosomal anomalies    -  Primary    Relevant Orders    [id number] PLUS Core+SCA      Encounter for (NT) nuchal translucency scan                Summary of follow up recommendations:   - Detailed anatomic survey at 21-22 weeks       History of Present Illness     Reason for consultation: Aneuploidy screening    Chief Complaint:   Chief Complaint   Patient presents with    Pregnancy Ultrasound      Karen [id number]        Referring physician:   Yardlie Toussaint-foster, Do  [address]  [address]  [place] [address]    Dear Dr Toussaint-Foster,    Thank you very much for your kind referral of patient Elvira Coffman for Maternal-Fetal Medicine consultation. As you know, Elvira is a 25 y.o. year-old  with an CASI of 2025, by Ultrasound at 13w6d.  Total weight gain thus far in pregnancy has been 5.534 kg (12 lb 3.2 oz).  Her history is as follows:       OB History    Para Term  AB Living   3 2 2 0 0 2   SAB IAB Ectopic Multiple Live Births   0 0 0 0 2      # Outcome Date GA Lbr Carter/2nd Weight Sex Type Anes PTL Lv   3 Current            2 Term 19 40w1d  3572 g (7 lb 14 oz) F    KACIE   1 Term 10/13/16 39w0d   M CS-Unspec  N KACIE      Birth Comments: reports  was done secondary to gestation hypertension, with pre-eclampsia       Past Medical History:  Class I Obesity     Past Surgical History:  Past Surgical History[1]    Social History:   She denies current use of alcohol, drugs of abuse, tobacco, and marijuana products.     Family  "History:  Family history was reviewed using an office screening tool, and is negative for congenital anomalies, genetic diseases, and thromboembolism in first degree relatives of this pregnancy.     Medications:  Current Medications[2]    Allergies:  No Known Allergies    Obstetric Review of Symptoms:  She denies any contractions, abdominal pain, leakage of fluid, or vaginal bleeding. She does not report fetal movement as it is too early in her pregnancy. She denies any other complaints.     Objective   Vitals: Blood pressure 120/64, pulse 88, height 5' 3\" (1.6 m), last menstrual period 2025, SpO2 98%, not currently breastfeeding.Body mass index is 34.4 kg/m².  On examination, she is well appearing, awake, alert, and oriented. Mood and affect are appropriate.    ---------------------------------------    At the conclusion of today's encounter, all questions were answered to her satisfaction.  Please note that while the recent CURES Act permits medical records be visible for patient review, clinical documentation is intended for utilization by healthcare professionals, and also, if the patient reviews her medical records, she may unintentionally review clinical information such as fetal sex. Thank you very much for this kind referral and please do not hesitate to contact me with any further questions or concerns.    Sincerely,    Erin Escobedo PA-C   Physician Assistant, Maternal-Fetal Medicine  [facility]      MDM:   Medical decision making for this encounter was moderate         [1]   Past Surgical History:  Procedure Laterality Date     SECTION      pt has 2 year old son, mom had a  due to high blood pressure     [place]  DELIVERY ONLY N/A 2019    Procedure:  SECTION () REPEAT;  Surgeon: Sherrill Hanna MD;  Location: [facility];  Service: Obstetrics   [2]   Current Outpatient Medications:     aspirin 81 mg chewable tablet, Chew 2 tablets (162 " mg total) daily, Disp: 60 tablet, Rfl: 11    Prenatal MV & Min w/FA-DHA (PRENATAL GUMMIES PO), Take by mouth daily, Disp: , Rfl:

## 2025-06-19 NOTE — PROGRESS NOTES
Patient chose to have [facility] [id number] Non-Invasive Prenatal Screen [id number] PLUS w/ SCA, WITH fetal sex.  Patient choose to be billed through insurance.     Patient given brochure and is aware [facility] will contact patient's insurance and coordinate coverage.  Provided [facility] contact information. General inquiries [phone], Cost estimates [phone] and [facility] Billing [phone]. Website [email or web address].     Blood collection tubes labeled with patient identifiers (name, medical record number, and date of birth).     Filled out [facility] order form. Patient chose to have blood drawn in our office at time of visit. NIPS was drawn from right arm with a butterfly needle by Odette AGUERO .      If patient chose to have blood work drawn at a [facility] lab we requested patient notify MFM (via phone call or [employer] message) when blood collected so office can follow up on results.       Maternal Fetal Medicine will have results in approximately 5-7 business days and will call patient or notify via [employer].  Patient aware viewing lab result online will reveal fetal sex if ordered.    Patient verbalized understanding of all instructions and no questions at this time.

## 2025-06-19 NOTE — ASSESSMENT & PLAN NOTE
Body mass index > 30 kg/m2 is associated with an increased risk of several pregnancy complications, including hypertensive disorders, diabetes, abnormal fetal growth, fetal malformations. The risk of  delivery is also increased, as are wound complications in the event of  delivery. A healthy diet and exercise, as well as appropriate gestational weight gain (no more than 11-20 pounds) can help reduce risk of these complications. 150 minutes of moderate exercise per week is recommended for all pregnant women. Nutrition counseling is also available if desired.    - She completed an early 1 hour glucose test, which was normal (94). Routine re-screening is recommended at 24-28 weeks.

## 2025-06-19 NOTE — ASSESSMENT & PLAN NOTE
We reviewed her history of preeclampsia in her prior pregnancy.  Though she does not recall specifics of this diagnosis, as it occurred in the [place], this history is documented in chart review.  It appears that her  was indicated for development of gestational hypertension with superimposed preeclampsia.  Baseline preeclampsia labs were reviewed prior to her visit today and were within normal limits.     Aspirin use to reduce risk of preeclampsia:   The use of low dose aspirin in pregnancy (162mg) is recommended in women with a high risk, or multiple moderate risk factors for preeclampsia.  Aspirin therapy should be initiated between 12-28 weeks gestation, and is most effective if started prior to 16 weeks gestation, and stopped by 36 weeks gestation. Low dose aspirin in pregnancy has been shown to reduce the incidence of preeclampsia in women with risk factors, and has been shown to be safe and without significant maternal or fetal risk.  I recommend initiating aspirin therapy, which has already been prescribed by her OB. Elvira was not aware that the prescription was already sent, so we reviewed dosing recommendations and which pharmacy to  her prescription at.

## 2025-06-24 ENCOUNTER — RESULTS FOLLOW-UP (OUTPATIENT)
Dept: PERINATAL CARE | Facility: OTHER | Age: 25
End: 2025-06-24

## 2025-06-24 LAB
CFDNA.FET/CFDNA.TOTAL SFR FETUS: NORMAL %
CFDNA.FET/CFDNA.TOTAL SFR FETUS: NORMAL %
CITATION REF LAB TEST: NORMAL
FET 13+18+21+X+Y ANEUP PLAS.CFDNA: NEGATIVE
FET CHR 21 TS PLAS.CFDNA QL: NEGATIVE
FET CHR 21 TS PLAS.CFDNA QL: NEGATIVE
FET MS X RISK WBC.DNA+CFDNA QL: NOT DETECTED
FET SEX PLAS.CFDNA DOSAGE CFDNA: NORMAL
FET TS 13 RISK PLAS.CFDNA QL: NEGATIVE
FET X + Y ANEUP RISK PLAS.CFDNA SEQ-IMP: NOT DETECTED
GA EST FROM CONCEPTION DATE: NORMAL D
GESTATIONAL AGE > 9:: YES
LAB DIRECTOR NAME PROVIDER: NORMAL
LABORATORY COMMENT REPORT: NORMAL
LIMITATIONS OF THE TEST: NORMAL
NEGATIVE PREDICTIVE VALUE: NORMAL
PERFORMANCE CHARACTERISTICS: NORMAL
POSITIVE PREDICTIVE VALUE: NORMAL
REF LAB TEST METHOD: NORMAL
SL AMB NOTE:: NORMAL
TEST PERFORMANCE INFO SPEC: NORMAL

## 2025-06-24 NOTE — TELEPHONE ENCOUNTER
Spoke to PT using  to confirm that NIPT results came back low risk. Also confirmed w/PT that if she would like to  sex of baby in envelope, she is welcome to come to one of [facility] offices before 4 pm any day to . PT asked if someone could  results on her behalf, apologized and confirmed w/PT that she had to be the one to  results. PT verbalized understanding.

## 2025-07-15 ENCOUNTER — APPOINTMENT (OUTPATIENT)
Dept: LAB | Facility: HOSPITAL | Age: 25
End: 2025-07-15

## 2025-07-15 ENCOUNTER — ROUTINE PRENATAL (OUTPATIENT)
Dept: OBGYN CLINIC | Facility: CLINIC | Age: 25
End: 2025-07-15

## 2025-07-15 VITALS — BODY MASS INDEX: 35.43 KG/M2 | SYSTOLIC BLOOD PRESSURE: 120 MMHG | DIASTOLIC BLOOD PRESSURE: 84 MMHG | WEIGHT: 200 LBS

## 2025-07-15 DIAGNOSIS — Z87.59 HISTORY OF GESTATIONAL HYPERTENSION: ICD-10-CM

## 2025-07-15 DIAGNOSIS — O34.219 HISTORY OF CESAREAN SECTION COMPLICATING PREGNANCY: ICD-10-CM

## 2025-07-15 DIAGNOSIS — O09.299 HISTORY OF PRE-ECLAMPSIA IN PRIOR PREGNANCY, CURRENTLY PREGNANT: ICD-10-CM

## 2025-07-15 DIAGNOSIS — Z3A.17 17 WEEKS GESTATION OF PREGNANCY: ICD-10-CM

## 2025-07-15 DIAGNOSIS — O99.212 OBESITY AFFECTING PREGNANCY IN SECOND TRIMESTER, UNSPECIFIED OBESITY TYPE: ICD-10-CM

## 2025-07-15 DIAGNOSIS — Z3A.17 17 WEEKS GESTATION OF PREGNANCY: Primary | ICD-10-CM

## 2025-07-15 PROCEDURE — 99214 OFFICE O/P EST MOD 30 MIN: CPT | Performed by: OBSTETRICS & GYNECOLOGY

## 2025-07-15 PROCEDURE — 82105 ALPHA-FETOPROTEIN SERUM: CPT

## 2025-07-15 PROCEDURE — 36415 COLL VENOUS BLD VENIPUNCTURE: CPT

## 2025-07-18 LAB
2ND TRIMESTER 4 SCREEN SERPL-IMP: NORMAL
AFP ADJ MOM SERPL: 1.13
AFP INTERP AMN-IMP: NORMAL
AFP INTERP SERPL-IMP: NORMAL
AFP INTERP SERPL-IMP: NORMAL
AFP SERPL-MCNC: 38.9 NG/ML
AGE AT DELIVERY: 23.8 YR
GA METHOD: NORMAL
GA: 17.6 WEEKS
IDDM PATIENT QL: NO
MULTIPLE PREGNANCY: NO
NEURAL TUBE DEFECT RISK FETUS: 8106 %

## 2025-07-29 ENCOUNTER — TELEPHONE (OUTPATIENT)
Dept: OBGYN CLINIC | Facility: CLINIC | Age: 25
End: 2025-07-29

## 2025-08-07 ENCOUNTER — ANCILLARY PROCEDURE (OUTPATIENT)
Age: 25
End: 2025-08-07
Attending: OBSTETRICS & GYNECOLOGY
Payer: COMMERCIAL

## 2025-08-07 ENCOUNTER — ROUTINE PRENATAL (OUTPATIENT)
Age: 25
End: 2025-08-07
Attending: OBSTETRICS & GYNECOLOGY
Payer: COMMERCIAL

## 2025-08-07 VITALS
HEIGHT: 63 IN | WEIGHT: 208.4 LBS | SYSTOLIC BLOOD PRESSURE: 118 MMHG | DIASTOLIC BLOOD PRESSURE: 68 MMHG | HEART RATE: 101 BPM | BODY MASS INDEX: 36.93 KG/M2

## 2025-08-07 DIAGNOSIS — E66.09 OTHER OBESITY DUE TO EXCESS CALORIES AFFECTING PREGNANCY IN SECOND TRIMESTER: ICD-10-CM

## 2025-08-07 DIAGNOSIS — Z87.59 HISTORY OF GESTATIONAL HYPERTENSION: ICD-10-CM

## 2025-08-07 DIAGNOSIS — Z3A.20 20 WEEKS GESTATION OF PREGNANCY: Primary | ICD-10-CM

## 2025-08-07 DIAGNOSIS — O34.219 HISTORY OF CESAREAN SECTION COMPLICATING PREGNANCY: ICD-10-CM

## 2025-08-07 DIAGNOSIS — O99.212 OTHER OBESITY DUE TO EXCESS CALORIES AFFECTING PREGNANCY IN SECOND TRIMESTER: ICD-10-CM

## 2025-08-07 DIAGNOSIS — O09.299 HISTORY OF PRE-ECLAMPSIA IN PRIOR PREGNANCY, CURRENTLY PREGNANT: ICD-10-CM

## 2025-08-13 ENCOUNTER — ROUTINE PRENATAL (OUTPATIENT)
Dept: OBGYN CLINIC | Facility: CLINIC | Age: 25
End: 2025-08-13

## 2025-08-13 PROBLEM — Z3A.21 21 WEEKS GESTATION OF PREGNANCY: Status: ACTIVE | Noted: 2025-06-11

## (undated) DEVICE — SCD SEQUENTIAL COMPRESSION COMFORT SLEEVE MEDIUM KNEE LENGTH: Brand: KENDALL SCD

## (undated) DEVICE — SUT MONOCRYL 4-0 PS-2 27 IN Y426H

## (undated) DEVICE — SUT VICRYL 0 CT-1 36 IN J946H

## (undated) DEVICE — SUT VICRYL 0 CTX 36 IN J978H

## (undated) DEVICE — GLOVE INDICATOR PI UNDERGLOVE SZ 6.5 BLUE

## (undated) DEVICE — ABDOMINAL PAD: Brand: DERMACEA

## (undated) DEVICE — SKIN MARKER DUAL TIP WITH RULER CAP, FLEXIBLE RULER AND LABELS: Brand: DEVON

## (undated) DEVICE — PACK C-SECTION PBDS

## (undated) DEVICE — GLOVE SRG BIOGEL ECLIPSE 6.5

## (undated) DEVICE — SUT VICRYL 3-0 CT-1 36 IN J944H

## (undated) DEVICE — CHLORAPREP HI-LITE 10.5ML ORANGE

## (undated) DEVICE — TELFA NON-ADHERENT ABSORBENT DRESSING: Brand: TELFA

## (undated) DEVICE — ADHESIVE SKN CLSR HISTOACRYL FLEX 0.5ML LF